# Patient Record
Sex: MALE | Race: WHITE
[De-identification: names, ages, dates, MRNs, and addresses within clinical notes are randomized per-mention and may not be internally consistent; named-entity substitution may affect disease eponyms.]

---

## 2018-04-10 LAB
BASOPHILS # BLD AUTO: 0.1 10*3/UL (ref 0–0.1)
BASOPHILS NFR BLD AUTO: 1.1 % (ref 0–1)
DEPRECATED NEUTROPHILS # BLD AUTO: 4.3 10*3/UL (ref 2.1–6.9)
EOSINOPHIL # BLD AUTO: 0.1 10*3/UL (ref 0–0.4)
EOSINOPHIL NFR BLD AUTO: 1.7 % (ref 0–6)
ERYTHROCYTE [DISTWIDTH] IN CORD BLOOD: 13.2 % (ref 11.7–14.4)
HCT VFR BLD AUTO: 39.6 % (ref 38.2–49.6)
HGB BLD-MCNC: 13.4 G/DL (ref 14–18)
LYMPHOCYTES # BLD: 1.5 10*3/UL (ref 1–3.2)
LYMPHOCYTES NFR BLD AUTO: 22.4 % (ref 18–39.1)
MCH RBC QN AUTO: 29.1 PG (ref 28–32)
MCHC RBC AUTO-ENTMCNC: 33.8 G/DL (ref 31–35)
MCV RBC AUTO: 86.1 FL (ref 81–99)
MONOCYTES # BLD AUTO: 0.6 10*3/UL (ref 0.2–0.8)
MONOCYTES NFR BLD AUTO: 9.3 % (ref 4.4–11.3)
NEUTS SEG NFR BLD AUTO: 65 % (ref 38.7–80)
PLATELET # BLD AUTO: 234 X10E3/UL (ref 140–360)
RBC # BLD AUTO: 4.6 X10E6/UL (ref 4.3–5.7)

## 2018-04-16 ENCOUNTER — HOSPITAL ENCOUNTER (OUTPATIENT)
Dept: HOSPITAL 88 - OR | Age: 78
Discharge: HOME | End: 2018-04-16
Attending: INTERNAL MEDICINE
Payer: MEDICARE

## 2018-04-16 DIAGNOSIS — R00.1: ICD-10-CM

## 2018-04-16 DIAGNOSIS — C44.90: ICD-10-CM

## 2018-04-16 DIAGNOSIS — N39.0: ICD-10-CM

## 2018-04-16 DIAGNOSIS — H91.90: ICD-10-CM

## 2018-04-16 DIAGNOSIS — I10: ICD-10-CM

## 2018-04-16 DIAGNOSIS — Z01.810: ICD-10-CM

## 2018-04-16 DIAGNOSIS — K57.30: ICD-10-CM

## 2018-04-16 DIAGNOSIS — K62.1: ICD-10-CM

## 2018-04-16 DIAGNOSIS — K44.9: ICD-10-CM

## 2018-04-16 DIAGNOSIS — Z12.11: Primary | ICD-10-CM

## 2018-04-16 PROCEDURE — 85025 COMPLETE CBC W/AUTO DIFF WBC: CPT

## 2018-04-16 PROCEDURE — 93005 ELECTROCARDIOGRAM TRACING: CPT

## 2018-04-16 PROCEDURE — 45384 COLONOSCOPY W/LESION REMOVAL: CPT

## 2018-04-16 PROCEDURE — 36415 COLL VENOUS BLD VENIPUNCTURE: CPT

## 2018-04-16 PROCEDURE — 88305 TISSUE EXAM BY PATHOLOGIST: CPT

## 2018-04-16 NOTE — OPERATIVE REPORT
DATE OF PROCEDURE:  April 16, 2018 



REFERRING PHYSICIAN:  Dr. Franchesca Acosta



PROCEDURE PERFORMED:  Colonoscopy and polypectomy.



INDICATIONS FOR COLONOSCOPY:  Colorectal cancer screening.



MEDICATION:  Patient was done under MAC.  Please see anesthesiologist's 

note.



PROCEDURE:  With the patient in the left lateral decubitus position, the 

flexible fiberoptic Olympus colonoscope was inserted into the rectum with 

ease advanced all the way to the cecum.  The scope was then withdrawn 

slowly.  Mucosa overlying the cecum, ascending colon, transverse colon, and 

descending colon appeared to be within normal limits.  Diverticular disease 

was noted in the sigmoid.  One polyp was hot biopsied from the rectum.  The 

scope was then retroflexed into the distal rectum, and the area around the 

dentate line appeared to be within normal limits.  The scope was then 

straightened out.  It was subsequently withdrawn.  Patient tolerated the 

procedure well.



IMPRESSION

1.  Diverticulosis.

2.  Rectal polyp, hot biopsied.





PLAN:  Follow up histology.  Initiate high-fiber and low-fat diet.  

Initiate high-fiber supplement.  Patient will need a followup colonoscopy 

in 3-5 years.    









DD:  04/16/2018 14:57

DT:  04/16/2018 15:04

Job#:  [id number]



cc:FRANCHESCA ACOSTA M.D.

## 2019-02-21 ENCOUNTER — HOSPITAL ENCOUNTER (OUTPATIENT)
Dept: HOSPITAL 88 - ST | Age: 79
LOS: 7 days | End: 2019-02-28
Attending: PSYCHIATRY & NEUROLOGY
Payer: MEDICARE

## 2019-02-21 DIAGNOSIS — G31.84: ICD-10-CM

## 2019-02-21 DIAGNOSIS — F80.1: Primary | ICD-10-CM

## 2019-02-21 DIAGNOSIS — I69.919: ICD-10-CM

## 2019-02-21 PROCEDURE — 92523 SPEECH SOUND LANG COMPREHEN: CPT

## 2019-03-15 NOTE — NUR
Speech and Language Re-Evaluation



Pt is a 78 year old male followed by this service for trial therapy due to mild to moderate 
expressive language deficits, apraxia, word finding deficits, and decreased volume of 
speech.  Pt has participated in eight sessions of therapy.  Short term goals and progress 
follow:

1.Pt will perform diodochokinetic exercises 25 times in 15 secondspt averaged 22 times in 
15 seconds (baseline was 7 times).

2.Pt will read words and phrases with simple and complex blends with 95% accuracypt read 
words and phrases with 90% accuracy (50% at baseline).

3.Pt will participate in speech motor exercises with 90% accuracypt completed motor speech 
exercises levels 1-4, which included CV, CVCV, and VCV syllables, voiced and unvoiced 
consonants, with 90% accuracy. Pt completed level 5 exercises, which included 2 syllable 
VCCV, voiced and unvoiced consonants, with 95% accuracy.  Pt completed level 6 exercises, 
which included three syllable CVCVCV voiced consonants with 80% accuracy, and level 7 
exercises, which included three syllable CVCVCV voiced and unvoiced consonants with 75% 
accuracy (baseline was 80% at levels 1-4 and 50% at level 5).  Pt also completed minimal 
pairs in the initial and final position with 56% accuracy (baseline 25% accuracy).

4.Pt will repeat familiar phrases with 85-90dBpt produced functional sentences with an 
average of 80 dB (baseline conversational loudness was 66 dB).  

5.Pt will follow dysarthria strategies in sessions and at home with [employer] addressed this 
session.

6. Pt will participate in continued dxt/txhome oral motor exercise program reviewed.  Also 
continued discussion about vocal hygiene.  Pt noted to clear his throat 13 times during the 
session, which is a marked improvement from last session.   

  

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 
word finding deficits.  Dyskinesias are still noted throughout session, especially when 
completing higher level oral motor speech tasks, but have improved significantly. Pt 
completes all tasks eagerly and showed high level of motivation to improve his communication 
skills.  Pt has demonstrated significant improvement since the initial eval and would 
benefit from continued speech and language therapy.  



Recommendation:

Recommend continued speech and language therapy 2x/week X 4 weeks.





Long Term Goal: 

Maximize functional communication skills for home and social environments.



Short Term Goals:

Continue goals 1-6 as listed above.





_____________________________________________

Jami Lo M.A. CCC-SLP

Date of Session: 3/14/19

Speech and Language Therapy X 65 minutes







VANDANA PEÑA Motor Speech Level 6

## 2019-03-26 ENCOUNTER — HOSPITAL ENCOUNTER (OUTPATIENT)
Dept: HOSPITAL 88 - ST | Age: 79
LOS: 5 days | End: 2019-03-31
Attending: PSYCHIATRY & NEUROLOGY
Payer: MEDICARE

## 2019-03-26 DIAGNOSIS — F80.1: ICD-10-CM

## 2019-03-26 DIAGNOSIS — I69.919: Primary | ICD-10-CM

## 2019-03-26 DIAGNOSIS — G31.84: ICD-10-CM
